# Patient Record
Sex: FEMALE | Race: BLACK OR AFRICAN AMERICAN | NOT HISPANIC OR LATINO | Employment: UNEMPLOYED | URBAN - NONMETROPOLITAN AREA
[De-identification: names, ages, dates, MRNs, and addresses within clinical notes are randomized per-mention and may not be internally consistent; named-entity substitution may affect disease eponyms.]

---

## 2017-09-27 ENCOUNTER — APPOINTMENT (OUTPATIENT)
Dept: LAB | Facility: HOSPITAL | Age: 27
End: 2017-09-27

## 2017-09-27 ENCOUNTER — TRANSCRIBE ORDERS (OUTPATIENT)
Dept: ADMINISTRATIVE | Facility: HOSPITAL | Age: 27
End: 2017-09-27

## 2017-09-27 DIAGNOSIS — Z79.899 POLYPHARMACY: Primary | ICD-10-CM

## 2017-09-27 LAB
AMPHET+METHAMPHET UR QL: NEGATIVE
BARBITURATES UR QL SCN: NEGATIVE
BENZODIAZ UR QL SCN: NEGATIVE
CANNABINOIDS SERPL QL: NEGATIVE
COCAINE UR QL: NEGATIVE
METHADONE UR QL SCN: NEGATIVE
OPIATES UR QL: NEGATIVE
PCP UR QL SCN: NEGATIVE

## 2017-09-27 PROCEDURE — 80307 DRUG TEST PRSMV CHEM ANLYZR: CPT | Performed by: PSYCHIATRY & NEUROLOGY

## 2017-12-27 ENCOUNTER — HOSPITAL ENCOUNTER (INPATIENT)
Age: 27
LOS: 5 days | Discharge: HOME | DRG: 885 | End: 2018-01-02
Attending: EMERGENCY MEDICINE | Admitting: PSYCHIATRY & NEUROLOGY
Payer: COMMERCIAL

## 2017-12-27 DIAGNOSIS — F20.9 SCHIZOPHRENIA, UNSPECIFIED TYPE (HCC): Primary | ICD-10-CM

## 2017-12-27 PROCEDURE — 99285 EMERGENCY DEPT VISIT HI MDM: CPT

## 2017-12-27 RX ORDER — ARIPIPRAZOLE 20 MG/1
20 TABLET ORAL DAILY
Status: ON HOLD | COMMUNITY
End: 2018-01-02 | Stop reason: HOSPADM

## 2017-12-27 RX ORDER — DIVALPROEX SODIUM 250 MG/1
250 TABLET, DELAYED RELEASE ORAL 3 TIMES DAILY
Status: ON HOLD | COMMUNITY
End: 2018-01-02 | Stop reason: HOSPADM

## 2017-12-27 ASSESSMENT — ENCOUNTER SYMPTOMS
SHORTNESS OF BREATH: 0
RHINORRHEA: 0
COUGH: 0
CONSTIPATION: 0
VOMITING: 0
NAUSEA: 0
DIARRHEA: 0
SORE THROAT: 0
TROUBLE SWALLOWING: 0
SINUS PRESSURE: 0
ABDOMINAL PAIN: 0

## 2017-12-28 PROBLEM — F29 PSYCHOSIS (HCC): Status: ACTIVE | Noted: 2017-12-28

## 2017-12-28 LAB
ALBUMIN SERPL-MCNC: 4.4 G/DL (ref 3.5–5.2)
ALP BLD-CCNC: 93 U/L (ref 35–104)
ALT SERPL-CCNC: 22 U/L (ref 5–33)
AMPHETAMINE SCREEN, URINE: NEGATIVE
ANION GAP SERPL CALCULATED.3IONS-SCNC: 14 MMOL/L (ref 7–19)
AST SERPL-CCNC: 19 U/L (ref 5–32)
BARBITURATE SCREEN URINE: NEGATIVE
BASOPHILS ABSOLUTE: 0 K/UL (ref 0–0.2)
BASOPHILS RELATIVE PERCENT: 0.3 % (ref 0–1)
BENZODIAZEPINE SCREEN, URINE: NEGATIVE
BILIRUB SERPL-MCNC: 0.4 MG/DL (ref 0.2–1.2)
BUN BLDV-MCNC: 9 MG/DL (ref 6–20)
CALCIUM SERPL-MCNC: 9.3 MG/DL (ref 8.6–10)
CANNABINOID SCREEN URINE: NEGATIVE
CHLORIDE BLD-SCNC: 100 MMOL/L (ref 98–111)
CO2: 23 MMOL/L (ref 22–29)
COCAINE METABOLITE SCREEN URINE: NEGATIVE
CREAT SERPL-MCNC: 0.8 MG/DL (ref 0.5–0.9)
EOSINOPHILS ABSOLUTE: 0.1 K/UL (ref 0–0.6)
EOSINOPHILS RELATIVE PERCENT: 0.7 % (ref 0–5)
ETHANOL: <10 MG/DL (ref 0–0.08)
GFR NON-AFRICAN AMERICAN: >60
GLUCOSE BLD-MCNC: 105 MG/DL (ref 74–109)
HCG(URINE) PREGNANCY TEST: NEGATIVE
HCT VFR BLD CALC: 39.4 % (ref 37–47)
HEMOGLOBIN: 14.4 G/DL (ref 12–16)
LYMPHOCYTES ABSOLUTE: 2.7 K/UL (ref 1.1–4.5)
LYMPHOCYTES RELATIVE PERCENT: 36 % (ref 20–40)
Lab: NORMAL
MCH RBC QN AUTO: 33.2 PG (ref 27–31)
MCHC RBC AUTO-ENTMCNC: 36.5 G/DL (ref 33–37)
MCV RBC AUTO: 90.8 FL (ref 81–99)
MONOCYTES ABSOLUTE: 0.9 K/UL (ref 0–0.9)
MONOCYTES RELATIVE PERCENT: 11.3 % (ref 0–10)
NEUTROPHILS ABSOLUTE: 3.9 K/UL (ref 1.5–7.5)
NEUTROPHILS RELATIVE PERCENT: 51.6 % (ref 50–65)
OPIATE SCREEN URINE: NEGATIVE
PDW BLD-RTO: 12.7 % (ref 11.5–14.5)
PLATELET # BLD: 282 K/UL (ref 130–400)
PMV BLD AUTO: 10.7 FL (ref 9.4–12.3)
POTASSIUM SERPL-SCNC: 4 MMOL/L (ref 3.5–5)
RBC # BLD: 4.34 M/UL (ref 4.2–5.4)
SODIUM BLD-SCNC: 137 MMOL/L (ref 136–145)
TOTAL PROTEIN: 7.8 G/DL (ref 6.6–8.7)
TSH SERPL DL<=0.05 MIU/L-ACNC: 1.36 UIU/ML (ref 0.27–4.2)
VALPROIC ACID LEVEL: 61 UG/ML (ref 50–100)
VITAMIN B-12: 689 PG/ML (ref 211–946)
VITAMIN D 25-HYDROXY: 27.3 NG/ML
WBC # BLD: 7.5 K/UL (ref 4.8–10.8)

## 2017-12-28 PROCEDURE — 90791 PSYCH DIAGNOSTIC EVALUATION: CPT | Performed by: PSYCHIATRY & NEUROLOGY

## 2017-12-28 PROCEDURE — 80053 COMPREHEN METABOLIC PANEL: CPT

## 2017-12-28 PROCEDURE — 80164 ASSAY DIPROPYLACETIC ACD TOT: CPT

## 2017-12-28 PROCEDURE — 6370000000 HC RX 637 (ALT 250 FOR IP): Performed by: PSYCHIATRY & NEUROLOGY

## 2017-12-28 PROCEDURE — 82607 VITAMIN B-12: CPT

## 2017-12-28 PROCEDURE — 1240000000 HC EMOTIONAL WELLNESS R&B

## 2017-12-28 PROCEDURE — 81025 URINE PREGNANCY TEST: CPT

## 2017-12-28 PROCEDURE — 82306 VITAMIN D 25 HYDROXY: CPT

## 2017-12-28 PROCEDURE — 99284 EMERGENCY DEPT VISIT MOD MDM: CPT | Performed by: EMERGENCY MEDICINE

## 2017-12-28 PROCEDURE — 36415 COLL VENOUS BLD VENIPUNCTURE: CPT

## 2017-12-28 PROCEDURE — 84443 ASSAY THYROID STIM HORMONE: CPT

## 2017-12-28 PROCEDURE — 80307 DRUG TEST PRSMV CHEM ANLYZR: CPT

## 2017-12-28 PROCEDURE — G0480 DRUG TEST DEF 1-7 CLASSES: HCPCS

## 2017-12-28 PROCEDURE — 85025 COMPLETE CBC W/AUTO DIFF WBC: CPT

## 2017-12-28 RX ORDER — DIVALPROEX SODIUM 250 MG/1
250 TABLET, DELAYED RELEASE ORAL 3 TIMES DAILY
Status: DISCONTINUED | OUTPATIENT
Start: 2017-12-28 | End: 2018-01-02 | Stop reason: HOSPADM

## 2017-12-28 RX ORDER — ARIPIPRAZOLE 10 MG/1
10 TABLET ORAL DAILY
Status: DISCONTINUED | OUTPATIENT
Start: 2017-12-28 | End: 2018-01-02 | Stop reason: HOSPADM

## 2017-12-28 RX ADMIN — DIVALPROEX SODIUM 250 MG: 250 TABLET, DELAYED RELEASE ORAL at 21:30

## 2017-12-28 RX ADMIN — ARIPIPRAZOLE 10 MG: 10 TABLET ORAL at 13:45

## 2017-12-28 RX ADMIN — DIVALPROEX SODIUM 250 MG: 250 TABLET, DELAYED RELEASE ORAL at 18:02

## 2017-12-28 ASSESSMENT — SLEEP AND FATIGUE QUESTIONNAIRES
DIFFICULTY ARISING: YES
RESTFUL SLEEP: YES
DIFFICULTY FALLING ASLEEP: NO
AVERAGE NUMBER OF SLEEP HOURS: 12
DIFFICULTY STAYING ASLEEP: NO
DO YOU USE A SLEEP AID: NO
DO YOU HAVE DIFFICULTY SLEEPING: YES

## 2017-12-28 ASSESSMENT — LIFESTYLE VARIABLES: HISTORY_ALCOHOL_USE: NO

## 2017-12-28 NOTE — PLAN OF CARE
Problem: Altered Mood, Psychotic Behavior  Goal: LTG-Able to verbalize decrease in frequency and intensity of hallucinations  Outcome: Ongoing    Goal: LTG-Able to verbalize reality based thinking  Outcome: Ongoing    Goal: LTG-Absence of self-harm  Outcome: Ongoing    Goal: LTG-Appropriate social interaction  Outcome: Ongoing    Goal: STG-Medication therapy compliance  Outcome: Ongoing

## 2017-12-28 NOTE — PROGRESS NOTES
Chinedu I completed psychosocial and CSSR-S on this date. Pt long and short term goals discussed. Pt voiced understanding. Treatment sheet signed.     In the last 6 months has the pt been danger to self: Yes  In the last 6 months has the pt been danger to others: No      Provided pt with [employer] handout entitled \"Quitting Smoking,\" reviewed handout with pt addressing dangers of smoking, developing coping skills, and providing basic information about quitting. Patient declined practical counseling of tobacco practical counseling during the hospital stay.      Electronically signed by Noe Myers on 12/28/2017 at 1:51 PM

## 2017-12-28 NOTE — PROGRESS NOTES
Admission Note      Reason for admission/Target Symptom: increasing depression and SI  Diagnoses:  UDS: Negative- Benzo- Opiate- Amphetamines- THC- Cocaine- Barbs  BAL: Negative     SW met with treatment team to discuss patient treatment and follow up care plans. Pt was admitted to [facility]. Treatment team has  identified patients discharge needs as medication management and therapy with [address] [place] [address]. Pt validated need for appointments. Pt was also provided a handout of contact information for drug and alcohol treatment centers and other community support service such as [facility] .

## 2017-12-28 NOTE — PROGRESS NOTES
Group Therapy Note    Start Time: 0900   End Time:  0930  Number of Participants: 15    Type of Group: Community Meeting       Patient's Goal:  Pt didn't list any goals      Notes:      Participation Level:  Active Listener       Participation Quality: Appropriate      Thought Process/Content: Logical      Affective Functioning: Congruent      Mood: calm      Level of consciousness:  Alert      Modes of Intervention: Support      Discipline Responsible: Behavioral Health Tech II      Signature:  Supriya Wray

## 2017-12-28 NOTE — PLAN OF CARE
Problem: Altered Mood, Depressive Behavior  Goal: STG-Knowledge of positive coping patterns  Outcome: Ongoing  Date: 12/28/2017  Start Time: 1100  End Time:  1659  Number of Participants: 13     Type of Group: Psychoeducation     Wellness Binder Information  Module Name:  Staying Well   Session Number:  1     Patient's Goal:  Daily maintenance and coping skills      Notes:  Pt participated in group discussion on relieving anxiety     Status After Intervention:  Improved     Participation Level: Active Listener and Interactive     Participation Quality: Appropriate, Attentive and Sharing        Speech:  normal        Thought Process/Content: Logical        Affective Functioning: Congruent        Mood: congruent         Level of consciousness:  Alert, Oriented x4 and Attentive        Response to Learning: Able to verbalize current knowledge/experience, Able to verbalize/acknowledge new learning and Able to retain information        Endings: None Reported     Modes of Intervention: Education, Support and Socialization        Discipline Responsible: Psychoeducational Specialist        Signature:   Pillo Rodriguez

## 2017-12-28 NOTE — PLAN OF CARE
Problem: Altered Mood, Depressive Behavior  Goal: LTG-Able to verbalize acceptance of life and situations over which he or she has no control  Outcome: Ongoing  Group Therapy Note     Date: 12/28/2017  Start Time:1430  End Time:  1515  Number of Participants: 13     Type of Group: Cognitive Skills     Wellness Binder Information  Module Name:  Spiritual Wellness   Session Number:  1     Patient's Goal:  Hope      Notes:  Pt discussed the 10 Principles of Healing and ways to foster hope      Status After Intervention:  Improved     Participation Level: Active Listener and Interactive     Participation Quality: Appropriate, Attentive and Sharing        Speech:  normal        Thought Process/Content: Logical        Affective Functioning: Congruent        Mood: congruent         Level of consciousness:  Alert, Oriented x4 and Attentive        Response to Learning: Able to verbalize current knowledge/experience, Able to verbalize/acknowledge new learning and Able to retain information        Endings: None Reported     Modes of Intervention: Education, Support and Socialization        Discipline Responsible: Psychoeducational Specialist        Signature:   Pillo Rodriguez

## 2017-12-29 PROCEDURE — 99231 SBSQ HOSP IP/OBS SF/LOW 25: CPT | Performed by: PSYCHIATRY & NEUROLOGY

## 2017-12-29 PROCEDURE — 6370000000 HC RX 637 (ALT 250 FOR IP): Performed by: PSYCHIATRY & NEUROLOGY

## 2017-12-29 PROCEDURE — 1240000000 HC EMOTIONAL WELLNESS R&B

## 2017-12-29 RX ORDER — ACETAMINOPHEN 325 MG/1
650 TABLET ORAL EVERY 4 HOURS PRN
Status: DISCONTINUED | OUTPATIENT
Start: 2017-12-29 | End: 2018-01-02 | Stop reason: HOSPADM

## 2017-12-29 RX ADMIN — DIVALPROEX SODIUM 250 MG: 250 TABLET, DELAYED RELEASE ORAL at 15:38

## 2017-12-29 RX ADMIN — ARIPIPRAZOLE 10 MG: 10 TABLET ORAL at 08:42

## 2017-12-29 RX ADMIN — DIVALPROEX SODIUM 250 MG: 250 TABLET, DELAYED RELEASE ORAL at 08:42

## 2017-12-29 RX ADMIN — DIVALPROEX SODIUM 250 MG: 250 TABLET, DELAYED RELEASE ORAL at 21:21

## 2017-12-29 RX ADMIN — ACETAMINOPHEN 650 MG: 325 TABLET ORAL at 01:35

## 2017-12-29 RX ADMIN — MAGNESIUM HYDROXIDE 30 ML: 400 SUSPENSION ORAL at 08:43

## 2017-12-29 ASSESSMENT — PAIN SCALES - GENERAL: PAINLEVEL_OUTOF10: 5

## 2017-12-29 NOTE — PROGRESS NOTES
Group Therapy Note    Start Time: 900  End Time:  997  Number of Participants: 15    Type of Group: Community Meeting       Patient's Goal:  \"don't know\"      Notes:      Participation Level:  Active Listener       Participation Quality: Appropriate      Thought Process/Content: Logical      Affective Functioning: Congruent      Mood: calm      Level of consciousness:  Alert      Modes of Intervention: Support      Discipline Responsible: Behavioral Health Tech II      Signature:  Guerda Parikh

## 2017-12-29 NOTE — PROGRESS NOTES
12/29/2017 11:02 AM   Progress Note        Curtis Brooks 1990  Psychotherapy Time Spent: 15 min      Psychotherapy Topics: health    Chief Complaint   Patient presents with    Psychiatric Evaluation       Subjective:  Patient seen today. Says she feels \"better\". Has not had any visual hallucinations since being admitted here. Nevertheless, she was hearing voices yesterday. She cannot tell me what they were saying, other than to say that they were \"weird\" and \"stupid\". No matter how much I tried she could not tell me anything about the nature of the content of the voices (male, female, single, multiple, loud or soft, and so on), which makes me wonder about the true validity of the experience. .. Rates depression 0 and anxiety 0. I'm not sure she is real familiar with this way of measuring symptoms. ... Patient reports side effects as follows: none. Reports no suicidal ideation. Reports compliance with medications as good . Review of Systems - Negative except see above    Current Meds:    Prior to Admission medications    Medication Sig Start Date End Date Taking? Authorizing Provider   divalproex (DEPAKOTE) 250 MG DR tablet Take 250 mg by mouth 3 times daily   Yes Historical Provider, MD   ARIPiprazole (ABILIFY) 20 MG tablet Take 20 mg by mouth daily   Yes Historical Provider, MD   haloperidol decanoate (HALDOL DECANOATE) 50 MG/ML injection Inject 50 mg into the muscle once   Yes Historical Provider, MD       @    MSE:  Patient is  A & O x3. Appearance:  well-appearing  Cognition:  Recent memory intact , remote memory intact , fair fund of knowledge, average (vs ? Below average) intelligence level. Speech:  normal  Language: Naming: intact;  Word Finding: intact  Conversation no evidence of delusions  Behavior:  Cooperative and Tense  Mood: depressed and anxious  Affect: congruent with mood and restricted  Thought Content: negative delusions, obsessions, homicidal and suicidal  Thought Process:

## 2017-12-29 NOTE — PROGRESS NOTES
Pt reports not sleeping well last night, she states \"I was creeped out, I kept seeing something like it was seeing something in my eyeball but I still felt happy. \"   She denies any anxiety or depression this morning. She denies SI and HI. She reports having visual hallucinations but no auditory this morning. She did shower this morning and dress for breakfast.  She is social and is med compliant.

## 2017-12-29 NOTE — PROGRESS NOTES
Group Therapy Note    Date: 12/28  Time: 2100    Patient attended and participated in [id number] [facility]. Filled out Wrap Up Group Documentation.     THELMA Escobar

## 2017-12-29 NOTE — PLAN OF CARE
Problem: Altered Mood, Depressive Behavior  Goal: STG-Knowledge of positive coping patterns  Outcome: Ongoing                                                                      Group Therapy Note    Date: 12/29/2017  Start Time: 1430  End Time:  9008  Number of Participants: 12    Type of Group: Cognitive Skills    Wellness Binder Information  Module Name:  Emotional wellness  Session Number:  4    Patient's Goal:  Self-esteem    Notes:  Pt was verbally prompted to attend group. Pt refused. Information about self-esteem was provided. Status After Intervention:      Participation Level:     Participation Quality:       Speech:         Thought Process/Content:       Affective Functioning:       Mood:       Level of consciousness:        Response to Learning:       Endings:     Modes of Intervention:       Discipline Responsible: psychoeducational specialist      Signature:  Zainab Vergara

## 2017-12-30 PROCEDURE — 3E0234Z INTRODUCTION OF SERUM, TOXOID AND VACCINE INTO MUSCLE, PERCUTANEOUS APPROACH: ICD-10-PCS | Performed by: PSYCHIATRY & NEUROLOGY

## 2017-12-30 PROCEDURE — 6370000000 HC RX 637 (ALT 250 FOR IP): Performed by: PSYCHIATRY & NEUROLOGY

## 2017-12-30 PROCEDURE — 90674 CCIIV4 VAC NO PRSV 0.5 ML IM: CPT | Performed by: PSYCHIATRY & NEUROLOGY

## 2017-12-30 PROCEDURE — 6360000002 HC RX W HCPCS: Performed by: PSYCHIATRY & NEUROLOGY

## 2017-12-30 PROCEDURE — G0008 ADMIN INFLUENZA VIRUS VAC: HCPCS | Performed by: PSYCHIATRY & NEUROLOGY

## 2017-12-30 PROCEDURE — 1240000000 HC EMOTIONAL WELLNESS R&B

## 2017-12-30 PROCEDURE — 99231 SBSQ HOSP IP/OBS SF/LOW 25: CPT | Performed by: PSYCHIATRY & NEUROLOGY

## 2017-12-30 RX ORDER — HALOPERIDOL 2 MG/1
2 TABLET ORAL 2 TIMES DAILY
Status: DISCONTINUED | OUTPATIENT
Start: 2017-12-30 | End: 2018-01-02 | Stop reason: HOSPADM

## 2017-12-30 RX ADMIN — DIVALPROEX SODIUM 250 MG: 250 TABLET, DELAYED RELEASE ORAL at 08:19

## 2017-12-30 RX ADMIN — HALOPERIDOL 2 MG: 2 TABLET ORAL at 21:00

## 2017-12-30 RX ADMIN — A/SINGAPORE/GP1908/2015 IVR-180 (H1N1) (AN A/MICHIGAN/45/2015-LIKE VIRUS), A/SINGAPORE/GP2050/2015 (H3N2) (AN A/HONG KONG/4801/2014 - LIKE VIRUS), B/UTAH/9/2014 (A B/PHUKET/3073/2013-LIKE VIRUS), B/HONG KONG/259/2010 (A B/BRISBANE/60/08-LIKE VIRUS) 0.5 ML: 15; 15; 15; 15 INJECTION, SUSPENSION INTRAMUSCULAR at 06:20

## 2017-12-30 RX ADMIN — ACETAMINOPHEN 650 MG: 325 TABLET ORAL at 10:56

## 2017-12-30 RX ADMIN — MAGNESIUM HYDROXIDE 30 ML: 400 SUSPENSION ORAL at 21:00

## 2017-12-30 RX ADMIN — DIVALPROEX SODIUM 250 MG: 250 TABLET, DELAYED RELEASE ORAL at 21:00

## 2017-12-30 RX ADMIN — ARIPIPRAZOLE 10 MG: 10 TABLET ORAL at 08:19

## 2017-12-30 RX ADMIN — DIVALPROEX SODIUM 250 MG: 250 TABLET, DELAYED RELEASE ORAL at 15:58

## 2017-12-30 RX ADMIN — HALOPERIDOL 2 MG: 2 TABLET ORAL at 10:33

## 2017-12-30 ASSESSMENT — PAIN SCALES - GENERAL: PAINLEVEL_OUTOF10: 5

## 2017-12-31 PROCEDURE — 6370000000 HC RX 637 (ALT 250 FOR IP): Performed by: PSYCHIATRY & NEUROLOGY

## 2017-12-31 PROCEDURE — 1240000000 HC EMOTIONAL WELLNESS R&B

## 2017-12-31 RX ORDER — DOCUSATE SODIUM 100 MG/1
100 CAPSULE, LIQUID FILLED ORAL 2 TIMES DAILY
Status: DISCONTINUED | OUTPATIENT
Start: 2018-01-01 | End: 2018-01-02 | Stop reason: HOSPADM

## 2017-12-31 RX ORDER — ERGOCALCIFEROL (VITAMIN D2) 1250 MCG
50000 CAPSULE ORAL WEEKLY
Qty: 11 CAPSULE | Refills: 0 | Status: SHIPPED | OUTPATIENT
Start: 2018-01-01 | End: 2018-03-13

## 2017-12-31 RX ORDER — ERGOCALCIFEROL 1.25 MG/1
50000 CAPSULE ORAL WEEKLY
Status: DISCONTINUED | OUTPATIENT
Start: 2018-01-01 | End: 2018-01-02 | Stop reason: HOSPADM

## 2017-12-31 RX ORDER — POLYETHYLENE GLYCOL 3350 17 G/17G
17 POWDER, FOR SOLUTION ORAL DAILY
Status: DISCONTINUED | OUTPATIENT
Start: 2018-01-01 | End: 2018-01-02 | Stop reason: HOSPADM

## 2017-12-31 RX ADMIN — DIVALPROEX SODIUM 250 MG: 250 TABLET, DELAYED RELEASE ORAL at 10:11

## 2017-12-31 RX ADMIN — HALOPERIDOL 2 MG: 2 TABLET ORAL at 10:10

## 2017-12-31 RX ADMIN — ARIPIPRAZOLE 10 MG: 10 TABLET ORAL at 10:10

## 2017-12-31 RX ADMIN — DIVALPROEX SODIUM 250 MG: 250 TABLET, DELAYED RELEASE ORAL at 21:16

## 2017-12-31 RX ADMIN — DIVALPROEX SODIUM 250 MG: 250 TABLET, DELAYED RELEASE ORAL at 14:41

## 2017-12-31 RX ADMIN — HALOPERIDOL 2 MG: 2 TABLET ORAL at 21:16

## 2017-12-31 RX ADMIN — MAGNESIUM HYDROXIDE 30 ML: 400 SUSPENSION ORAL at 10:24

## 2017-12-31 NOTE — PLAN OF CARE
Problem: Altered Mood, Depressive Behavior  Goal: STG-Able to verbalize support system  Outcome: Ongoing                                                                      Group Therapy Note    Date: 12/31/2017  Start Time: 0631  End Time:  4273  Number of Participants: 14    Type of Group: Cognitive Skills    Wellness Binder Information  Module Name:  Social Support  Session Number:  1. Patient's Goal:  Why is Social Support so Important? SW used verbal prompts to encourage pt to attend group but pt refused.       Discipline Responsible: Psychoeducational Specialist      Signature:  Sen Bliss

## 2017-12-31 NOTE — PROGRESS NOTES
Pt provided a pair of 3.25, brunette reading glasses for her to wear on unit and take home with her upon discharge.

## 2017-12-31 NOTE — PROGRESS NOTES
Pt ALOx4, pleasant and redirectable. Pt is cooperative, eats well, is not social but med compliant. Pt is flat and lacks expression when communicating. Pt has delayed response to questions but thus far has not had any issues. Pt has requested glasses to help her see for the last few days. Attempted [facility] to see if there was an extra pair. The glasses were not strong enough, so I returned them to Tommy Harry RN, on [facility]. Pt rates depression 0, anxiety 0, and hears voices that sounds like monsters and almost saw something this morning, but didn't know what it was. Pt denies SI and HI.

## 2018-01-01 PROCEDURE — 1240000000 HC EMOTIONAL WELLNESS R&B

## 2018-01-01 PROCEDURE — 6370000000 HC RX 637 (ALT 250 FOR IP): Performed by: PSYCHIATRY & NEUROLOGY

## 2018-01-01 PROCEDURE — 6370000000 HC RX 637 (ALT 250 FOR IP): Performed by: FAMILY MEDICINE

## 2018-01-01 PROCEDURE — 99231 SBSQ HOSP IP/OBS SF/LOW 25: CPT | Performed by: PSYCHIATRY & NEUROLOGY

## 2018-01-01 RX ORDER — MAGNESIUM HYDROXIDE/ALUMINUM HYDROXICE/SIMETHICONE 120; 1200; 1200 MG/30ML; MG/30ML; MG/30ML
30 SUSPENSION ORAL EVERY 6 HOURS PRN
Status: DISCONTINUED | OUTPATIENT
Start: 2018-01-01 | End: 2018-01-02 | Stop reason: HOSPADM

## 2018-01-01 RX ADMIN — HALOPERIDOL 2 MG: 2 TABLET ORAL at 20:47

## 2018-01-01 RX ADMIN — DIVALPROEX SODIUM 250 MG: 250 TABLET, DELAYED RELEASE ORAL at 13:16

## 2018-01-01 RX ADMIN — MAGNESIUM HYDROXIDE 30 ML: 400 SUSPENSION ORAL at 11:19

## 2018-01-01 RX ADMIN — DIVALPROEX SODIUM 250 MG: 250 TABLET, DELAYED RELEASE ORAL at 20:47

## 2018-01-01 RX ADMIN — ARIPIPRAZOLE 10 MG: 10 TABLET ORAL at 10:17

## 2018-01-01 RX ADMIN — DOCUSATE SODIUM 100 MG: 100 CAPSULE, LIQUID FILLED ORAL at 11:22

## 2018-01-01 RX ADMIN — POLYETHYLENE GLYCOL 3350 17 G: 17 POWDER, FOR SOLUTION ORAL at 11:19

## 2018-01-01 RX ADMIN — HALOPERIDOL 2 MG: 2 TABLET ORAL at 11:23

## 2018-01-01 RX ADMIN — ACETAMINOPHEN 650 MG: 325 TABLET ORAL at 13:16

## 2018-01-01 RX ADMIN — ERGOCALCIFEROL 50000 UNITS: 1.25 CAPSULE ORAL at 11:22

## 2018-01-01 RX ADMIN — DIVALPROEX SODIUM 250 MG: 250 TABLET, DELAYED RELEASE ORAL at 11:22

## 2018-01-01 RX ADMIN — ACETAMINOPHEN 650 MG: 325 TABLET ORAL at 22:15

## 2018-01-01 RX ADMIN — DOCUSATE SODIUM 100 MG: 100 CAPSULE, LIQUID FILLED ORAL at 20:47

## 2018-01-01 ASSESSMENT — PAIN SCALES - GENERAL
PAINLEVEL_OUTOF10: 5
PAINLEVEL_OUTOF10: 3

## 2018-01-01 NOTE — PLAN OF CARE
Problem: Altered Mood, Depressive Behavior  Goal: STG-Knowledge of positive coping patterns  Outcome: Ongoing                                                                      Group Therapy Note    Date: 1/1/2018  Start Time: 5372  End Time:  1600  Number of Participants: 13    Type of Group: Recovery    Wellness Binder Information  Module Name:  Staying well  Session Number:  1    Patient's Goal:  Daily maintenance and coping skills    Notes:  Pt acknowledged use of positive coping skills daily to help stay well.     Status After Intervention:  Improved    Participation Level: Interactive    Participation Quality: Appropriate, Attentive and Sharing      Speech:  normal      Thought Process/Content: Logical      Affective Functioning: Congruent      Mood: congruent      Level of consciousness:  Alert, Oriented x4 and Attentive      Response to Learning: Able to verbalize current knowledge/experience      Endings: None Reported    Modes of Intervention: Education      Discipline Responsible: Psychoeducational Specialist      Signature:  Wilfred Mcneil
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
## 2018-01-01 NOTE — PLAN OF CARE
Problem: Altered Mood, Depressive Behavior  Goal: STG-Knowledge of positive coping patterns  Outcome: Ongoing                                                                      Group Therapy Note    Date: 1/1/2018  Start Time: 1430  End Time:  9480  Number of Participants: 13    Type of Group: Cognitive Skills    Wellness Binder Information  Module Name:  Social wellness  Session Number:  1    Patient's Goal:  Your support network    Notes:  Pt acknowledged , Doctor, and Family as examples of a support network.     Status After Intervention:  Improved    Participation Level: Interactive    Participation Quality: Appropriate, Attentive and Sharing      Speech:  normal      Thought Process/Content: Logical      Affective Functioning: Congruent      Mood: congruent      Level of consciousness:  Alert, Oriented x4 and Attentive      Response to Learning: Able to verbalize current knowledge/experience      Endings: None Reported    Modes of Intervention: Education      Discipline Responsible: Psychoeducational Specialist      Signature:  Gatito Fonseca

## 2018-01-02 VITALS
HEIGHT: 61 IN | OXYGEN SATURATION: 100 % | WEIGHT: 241.8 LBS | SYSTOLIC BLOOD PRESSURE: 143 MMHG | DIASTOLIC BLOOD PRESSURE: 88 MMHG | BODY MASS INDEX: 45.65 KG/M2 | HEART RATE: 87 BPM | RESPIRATION RATE: 16 BRPM | TEMPERATURE: 97.9 F

## 2018-01-02 PROBLEM — F32.A DEPRESSION: Status: ACTIVE | Noted: 2018-01-02

## 2018-01-02 PROCEDURE — 99238 HOSP IP/OBS DSCHRG MGMT 30/<: CPT | Performed by: PSYCHIATRY & NEUROLOGY

## 2018-01-02 PROCEDURE — 6370000000 HC RX 637 (ALT 250 FOR IP): Performed by: PSYCHIATRY & NEUROLOGY

## 2018-01-02 PROCEDURE — 5130000000 HC BRIDGE APPOINTMENT

## 2018-01-02 PROCEDURE — 6370000000 HC RX 637 (ALT 250 FOR IP): Performed by: FAMILY MEDICINE

## 2018-01-02 RX ORDER — HALOPERIDOL 2 MG/1
2 TABLET ORAL 2 TIMES DAILY
Qty: 60 TABLET | Refills: 0 | Status: SHIPPED | OUTPATIENT
Start: 2018-01-02

## 2018-01-02 RX ORDER — ARIPIPRAZOLE 10 MG/1
10 TABLET ORAL DAILY
Qty: 30 TABLET | Refills: 0 | Status: SHIPPED | OUTPATIENT
Start: 2018-01-03

## 2018-01-02 RX ORDER — DIVALPROEX SODIUM 250 MG/1
250 TABLET, DELAYED RELEASE ORAL 3 TIMES DAILY
Qty: 90 TABLET | Refills: 0 | Status: SHIPPED | OUTPATIENT
Start: 2018-01-02

## 2018-01-02 RX ADMIN — ARIPIPRAZOLE 10 MG: 10 TABLET ORAL at 08:56

## 2018-01-02 RX ADMIN — DOCUSATE SODIUM 100 MG: 100 CAPSULE, LIQUID FILLED ORAL at 08:56

## 2018-01-02 RX ADMIN — POLYETHYLENE GLYCOL 3350 17 G: 17 POWDER, FOR SOLUTION ORAL at 08:56

## 2018-01-02 RX ADMIN — DIVALPROEX SODIUM 250 MG: 250 TABLET, DELAYED RELEASE ORAL at 08:56

## 2018-01-02 RX ADMIN — HALOPERIDOL 2 MG: 2 TABLET ORAL at 08:56

## 2018-01-02 NOTE — PROGRESS NOTES
Group Therapy Note    Start Time: 0900  End Time:  0930  Number of Participants: 18    Type of Group: Community Meeting       Patient's Goal:  \"going home on discharge\"      Notes:      Participation Level:  Active Listener       Participation Quality: Appropriate      Thought Process/Content: Logical      Affective Functioning: Congruent      Mood: calm      Level of consciousness:  Alert      Modes of Intervention: Support      Discipline Responsible: Behavioral Health Tech II      Signature:  Amira Ventura

## 2018-01-02 NOTE — PROGRESS NOTES
BHI Daily Shift Assessment  Nursing Progress Note    Room: [facility]/605-02 Name: Tamiko Su Age: 33 y.o. Ethnicity: -American Gender: female   Dx: <principal problem not specified>  Precautions: suicide risk  CPAP: No Accu-Chek: No  MSE:  Status and Exam  Normal: No  Facial Expression: Flat  Affect: Appropriate  Level of Consciousness: Alert  Mood:Normal: Yes  Mood: Anxious, Suspicious  Motor Activity:Normal: Yes  Motor Activity: Decreased  Interview Behavior: Cooperative  Preception: [place] to Person, [place] to Time, [place] to Place, [place] to Situation  Attention:Normal: Yes  Attention: Unable to Concentrate  Thought Processes: Blocking  Thought Content:Normal: Yes  Thought Content: Paranoia, Poverty of Content, Preoccupations  Hallucinations: None  Delusions: No  Delusions: Persecution  Memory:Normal: Yes  Memory: Poor Recent, Poor Remote  Insight and Judgment: No  Insight and Judgment: Poor Judgment, Poor Insight  Present Suicidal Ideation: No  Present Homicidal Ideation: No  Sleep: Yes, Fair, no sleep issues Hours Slept:  Sched Sleep Meds: Yes PRN Sleep Meds: No Other PRN Meds: Yes Med Compliant: Yes Appetite: good Percent Meals: 75% Social: slightly ADLs: Yes Speech: normal Depression: 0 Anxiety: 0    Pt asked for tylenol for a headache tonight, also wanted lotion for her scalp. No obvious s/s of distress voiced or noted. Will continue to monitor.     Isiah Jama RN

## 2018-01-02 NOTE — DISCHARGE SUMMARY
Discharge Summary     Patient ID:  Melanie Montero  [id number]  57 y.o.  1990    Admit date: 12/27/2017  Discharge date: 1/2/2018    Admitting Physician: Gatito Seymour MD   Attending Physician: Gatito Seymour MD  Discharge Physician: Melvina Menon     Admission Diagnoses: Hallucinations [R44.3]  Psychosis, unspecified psychosis type [F29]  Depression, unspecified depression type [F32.9]    Discharge Diagnoses: Depression    Admission Condition: poor    Discharged Condition: improved    Indication for Admission: The patient is a 55-year-old single [facility] female, who is disabled. She has no children. She does not work. Her chief complaint was \"I saw an evil spirit in the TV, I did not feel  safe\", so she decided to come here. Hospital Course: Patient was admitted to the unit and oriented to it. She related well to peers and staff. She attended groups. She was able to consistently deny suicidal ideas virtually throughout this admission. She requested to be put back on Haldol, which I felt to be a reasonable request, especially given verified that apparently she responded well to it in the past. This time again she responded well when she was able to consistently deny hallucinations throughout the last few days of this admission. She was discharged uneventfully.     Consults: Dr. Yuniel Ace for medical    Significant Diagnostic Studies: Routine labs    Treatments: Medications, groups and individual counseling    Discharge Exam:  Within normal limits, with no suicidal ideas or hallucinations    Disposition: home    Patient Instructions:   Current Discharge Medication List      START taking these medications    Details   haloperidol (HALDOL) 2 MG tablet Take 1 tablet by mouth 2 times daily  Qty: 60 tablet, Refills: 0      vitamin D (ERGOCALCIFEROL) 73972 units capsule Take 1 capsule by mouth once a week for 11 doses  Qty: 11 capsule, Refills: 0         CONTINUE these medications which have CHANGED Details   divalproex (DEPAKOTE) 250 MG DR tablet Take 1 tablet by mouth 3 times daily  Qty: 90 tablet, Refills: 0      ARIPiprazole (ABILIFY) 10 MG tablet Take 1 tablet by mouth daily  Qty: 30 tablet, Refills: 0         CONTINUE these medications which have NOT CHANGED    Details   haloperidol decanoate (HALDOL DECANOATE) 50 MG/ML injection Inject 50 mg into the muscle once         STOP taking these medications       lithium 300 MG tablet Comments:   Reason for Stopping:         carBAMazepine (TEGRETOL XR) 200 MG extended release tablet Comments:   Reason for Stopping:             Activity: activity as tolerated  Diet: regular diet  Wound Care: none needed    Follow-up with per social work.     Time worked: Less than 30 minutes    Participation:good    Electronically signed by Sudhakar Stephen MD on 1/2/2018 at 11:49 AM

## 2018-09-27 NOTE — ED NOTES
Bridget Check at [address] [facility]  12/28/17 7527
Bridget Check at pt bedside     Nikolay Hartman  12/28/17 1916
NP @ [address] [place] [address] [place], RN  12/27/17 8395
Upon entering the room pt asks me if she can go to [facility], then tells me she is tired of being scared.
Patient

## 2019-05-23 ENCOUNTER — HOSPITAL ENCOUNTER (EMERGENCY)
Age: 29
Discharge: ANOTHER HOSPITAL | End: 2019-05-23
Payer: COMMERCIAL

## 2019-05-23 VITALS
HEIGHT: 62 IN | HEART RATE: 71 BPM | SYSTOLIC BLOOD PRESSURE: 148 MMHG | DIASTOLIC BLOOD PRESSURE: 95 MMHG | WEIGHT: 240 LBS | BODY MASS INDEX: 44.16 KG/M2 | OXYGEN SATURATION: 96 % | TEMPERATURE: 98 F | RESPIRATION RATE: 17 BRPM

## 2019-05-23 DIAGNOSIS — F20.1 DISORGANIZED SCHIZOPHRENIA (HCC): Primary | ICD-10-CM

## 2019-05-23 LAB
ACETAMINOPHEN LEVEL: <15 UG/ML
ALBUMIN SERPL-MCNC: 4.3 G/DL (ref 3.5–5.2)
ALP BLD-CCNC: 84 U/L (ref 35–104)
ALT SERPL-CCNC: 20 U/L (ref 5–33)
AMPHETAMINE SCREEN, URINE: NEGATIVE
ANION GAP SERPL CALCULATED.3IONS-SCNC: 12 MMOL/L (ref 7–19)
AST SERPL-CCNC: 20 U/L (ref 5–32)
BARBITURATE SCREEN URINE: NEGATIVE
BASOPHILS ABSOLUTE: 0 K/UL (ref 0–0.2)
BASOPHILS RELATIVE PERCENT: 0.3 % (ref 0–1)
BENZODIAZEPINE SCREEN, URINE: NEGATIVE
BILIRUB SERPL-MCNC: 0.3 MG/DL (ref 0.2–1.2)
BUN BLDV-MCNC: 4 MG/DL (ref 6–20)
CALCIUM SERPL-MCNC: 9.2 MG/DL (ref 8.6–10)
CANNABINOID SCREEN URINE: NEGATIVE
CHLORIDE BLD-SCNC: 108 MMOL/L (ref 98–111)
CO2: 19 MMOL/L (ref 22–29)
COCAINE METABOLITE SCREEN URINE: NEGATIVE
CREAT SERPL-MCNC: 0.7 MG/DL (ref 0.5–0.9)
EOSINOPHILS ABSOLUTE: 0.2 K/UL (ref 0–0.6)
EOSINOPHILS RELATIVE PERCENT: 2.4 % (ref 0–5)
ETHANOL: <10 MG/DL (ref 0–0.08)
GFR NON-AFRICAN AMERICAN: >60
GLUCOSE BLD-MCNC: 95 MG/DL (ref 74–109)
HCG(URINE) PREGNANCY TEST: NEGATIVE
HCT VFR BLD CALC: 39.7 % (ref 37–47)
HEMOGLOBIN: 14.7 G/DL (ref 12–16)
LYMPHOCYTES ABSOLUTE: 3.1 K/UL (ref 1.1–4.5)
LYMPHOCYTES RELATIVE PERCENT: 49.5 % (ref 20–40)
Lab: NORMAL
MCH RBC QN AUTO: 33 PG (ref 27–31)
MCHC RBC AUTO-ENTMCNC: 37 G/DL (ref 33–37)
MCV RBC AUTO: 89.2 FL (ref 81–99)
MONOCYTES ABSOLUTE: 0.5 K/UL (ref 0–0.9)
MONOCYTES RELATIVE PERCENT: 8.6 % (ref 0–10)
NEUTROPHILS ABSOLUTE: 2.5 K/UL (ref 1.5–7.5)
NEUTROPHILS RELATIVE PERCENT: 39 % (ref 50–65)
OPIATE SCREEN URINE: NEGATIVE
PDW BLD-RTO: 13.5 % (ref 11.5–14.5)
PLATELET # BLD: 236 K/UL (ref 130–400)
PMV BLD AUTO: 11.6 FL (ref 9.4–12.3)
POTASSIUM REFLEX MAGNESIUM: 4.6 MMOL/L (ref 3.5–5)
RBC # BLD: 4.45 M/UL (ref 4.2–5.4)
SALICYLATE, SERUM: <3 MG/DL (ref 3–10)
SODIUM BLD-SCNC: 139 MMOL/L (ref 136–145)
TOTAL PROTEIN: 7.3 G/DL (ref 6.6–8.7)
VALPROIC ACID LEVEL: <2.8 UG/ML (ref 50–100)
WBC # BLD: 6.3 K/UL (ref 4.8–10.8)

## 2019-05-23 PROCEDURE — G0480 DRUG TEST DEF 1-7 CLASSES: HCPCS

## 2019-05-23 PROCEDURE — 80164 ASSAY DIPROPYLACETIC ACD TOT: CPT

## 2019-05-23 PROCEDURE — 99285 EMERGENCY DEPT VISIT HI MDM: CPT | Performed by: NURSE PRACTITIONER

## 2019-05-23 PROCEDURE — 2580000003 HC RX 258: Performed by: NURSE PRACTITIONER

## 2019-05-23 PROCEDURE — 80307 DRUG TEST PRSMV CHEM ANLYZR: CPT

## 2019-05-23 PROCEDURE — 80053 COMPREHEN METABOLIC PANEL: CPT

## 2019-05-23 PROCEDURE — 36415 COLL VENOUS BLD VENIPUNCTURE: CPT

## 2019-05-23 PROCEDURE — 84703 CHORIONIC GONADOTROPIN ASSAY: CPT

## 2019-05-23 PROCEDURE — 85025 COMPLETE CBC W/AUTO DIFF WBC: CPT

## 2019-05-23 PROCEDURE — 99284 EMERGENCY DEPT VISIT MOD MDM: CPT

## 2019-05-23 RX ORDER — ZIPRASIDONE MESYLATE 20 MG/ML
10 INJECTION, POWDER, LYOPHILIZED, FOR SOLUTION INTRAMUSCULAR ONCE
Status: DISCONTINUED | OUTPATIENT
Start: 2019-05-23 | End: 2019-05-23 | Stop reason: HOSPADM

## 2019-05-23 RX ORDER — 0.9 % SODIUM CHLORIDE 0.9 %
1000 INTRAVENOUS SOLUTION INTRAVENOUS ONCE
Status: COMPLETED | OUTPATIENT
Start: 2019-05-23 | End: 2019-05-23

## 2019-05-23 RX ORDER — ZIPRASIDONE MESYLATE 20 MG/ML
INJECTION, POWDER, LYOPHILIZED, FOR SOLUTION INTRAMUSCULAR
Status: DISCONTINUED
Start: 2019-05-23 | End: 2019-05-23 | Stop reason: HOSPADM

## 2019-05-23 RX ADMIN — SODIUM CHLORIDE 1000 ML: 9 INJECTION, SOLUTION INTRAVENOUS at 12:33

## 2019-05-23 ASSESSMENT — PAIN DESCRIPTION - LOCATION: LOCATION: ABDOMEN

## 2019-05-23 ASSESSMENT — PAIN DESCRIPTION - PAIN TYPE: TYPE: ACUTE PAIN

## 2019-05-23 ASSESSMENT — PAIN SCALES - GENERAL: PAINLEVEL_OUTOF10: 4
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
## 2019-05-23 NOTE — ED NOTES
Saw Patient with mid-level and agree with plan. Patient is stable for transfer to [facility]. Patient was given 1 dose of Geodon 10 mg IM. She is improved.      Stephanie Loaiza MD  05/23/19 0823

## 2019-05-23 NOTE — ED PROVIDER NOTES
[facility] EMERGENCY DEPT  eMERGENCY dEPARTMENT eNCOUnter      Pt Name: Steve Díaz  MRN: [id number]  Kiragfdelvis 1990  Date of evaluation: 5/23/2019  Provider: RANDALL Marroquin    CHIEF COMPLAINT       Chief Complaint   Patient presents with   [address] Problem     pt is here w mother who states pt is \"eating weird things, destroyed her apartment, and hoarding\"          HISTORY OF PRESENT ILLNESS   (Location/Symptom, Timing/Onset,Context/Setting, Quality, Duration, Modifying Factors, Severity)  Note limiting factors. Fuad Ortiz a 30 y.o. female who presents to the emergency department for evaluation of mental health problem. Pt presents with mom giving history of decompensated schizophrenia worsening over past month. She tells me that patient is generally able to live alone. Mom tells me that apartment and patient have been disheveled lately. She has destroyed a computer and tv recently reporting that demons were coming out of these electronic devices describing them being threatening to her. She has been eating non food substances-cat food. Mom tells me that she doesn't think she has been drinking enough fluids. Pt tells me that she stopped her medications a month ago. Mom tells me that she [facility] close [facility] but is no longer attending treatment there. HPI    Nursing Notes were reviewed. REVIEW OF SYSTEMS    (2-9 systems for level 4, 10 or more for level 5)     Review of Systems   Unable to perform ROS: Psychiatric disorder       A complete review of systems was performed and is negative except as noted above in the HPI. PAST MEDICAL HISTORY     Past Medical History:   Diagnosis Date    Schizophrenia, schizo-affective ([facility] [address]     Schizophrenia, schizo-affective type, depressed ([facility] [address]          SURGICAL HISTORY     No past surgical history on file.       CURRENT MEDICATIONS       Previous Medications    ARIPIPRAZOLE (ABILIFY) 10 MG TABLET    Take 1 tablet by mouth daily    DIVALPROEX (DEPAKOTE) 250 MG DR TABLET    Take 1 tablet by mouth 3 times daily    HALOPERIDOL (HALDOL) 2 MG TABLET    Take 1 tablet by mouth 2 times daily    HALOPERIDOL DECANOATE (HALDOL DECANOATE) 50 MG/ML INJECTION    Inject 50 mg into the muscle once    VITAMIN D (ERGOCALCIFEROL) 01158 UNITS CAPSULE    Take 1 capsule by mouth once a week for 11 doses       ALLERGIES     Patient has no known allergies. FAMILY HISTORY     No family history on file.        SOCIAL HISTORY       Social History     Socioeconomic History    Marital status: Single     Spouse name: Not on file    Number of children: Not on file    Years of education: Not on file    Highest education level: Not on file   Occupational History    Not on file   Social Needs    Financial resource strain: Not on file    Food insecurity:     Worry: Not on file     Inability: Not on file    Transportation needs:     Medical: Not on file     Non-medical: Not on file   Tobacco Use    Smoking status: Never Smoker   Substance and Sexual Activity    Alcohol use: No    Drug use: No    Sexual activity: Not on file   Lifestyle    Physical activity:     Days per week: Not on file     Minutes per session: Not on file    Stress: Not on file   Relationships    Social connections:     Talks on phone: Not on file     Gets together: Not on file     Attends Je[employer] witness service: Not on file     Active member of club or organization: Not on file     Attends meetings of clubs or organizations: Not on file     Relationship status: Not on file    Intimate partner violence:     Fear of current or ex partner: Not on file     Emotionally abused: Not on file     Physically abused: Not on file     Forced sexual activity: Not on file   Other Topics Concern    Not on file   Social History Narrative    Not on file       SCREENINGS             PHYSICAL EXAM    (up to 7 for level 4, 8 or more for level 5)     ED Triage Vitals [05/23/19 1135]   BP Temp Temp Source PREGNANCY, URINE   ETHANOL   ACETAMINOPHEN LEVEL   SALICYLATE LEVEL       All other labs were within normal range or not returned as of this dictation. RE-ASSESSMENT     Mental health professional Taz Valera has evaluated patient in conjunction with Dr. Alma Reed with plan to transfer patient to [place] [address] and DIFFERENTIALDIAGNOSIS/MDM:   Vitals:    Vitals:    05/23/19 1135   BP: (!) 148/95   Pulse: 71   Resp: 17   Temp: 98 °F (36.7 °C)   TempSrc: Oral   SpO2: 96%   Weight: 240 lb (108.9 kg)   Height: 5' 2\" (1.575 m)       MDM      CONSULTS:  IP CONSULT TO PSYCHIATRY    PROCEDURES:  Unless otherwise notedbelow, none     Procedures    FINAL IMPRESSION     1. Disorganized schizophrenia ([address]          DISPOSITION/PLAN   DISPOSITION        PATIENT REFERRED TO:  No follow-up provider specified.     DISCHARGE MEDICATIONS:       Current Discharge Medication List          (Pleasenote that portions of this note were completed with a voice recognition program.  Efforts were made to edit the dictations but occasionally words are mis-transcribed.)              Long Rater, APRN  05/23/19 9823

## 2019-05-23 NOTE — BH NOTE
Psychiatry Initial Intake    5/23/19    Glo Hernandez ,a 30 y.o. female, presents to the ED for a psychiatric assessment. ED Arrival time: 80  ED physician: [facility] Notification time: 1400  GO Assess time: 1400  Psychiatrist call time: 46  Spoke with Dr. Nataliia Dowling Diagnosis or Reason for Discharge:transfer to [facility]   ETOH:<10    Patient is referred by: brought to Ed by her mother. Reason for visit to ED - Presenting problem:     PT states reason for ED visit, \"I stopped taking my pills & I needs some more pills. I ran out of food, I ate some noodles, beans and wheat. I need some homeless resources for some housing. I need a therapy source clinic & I just need some paperwork. I need some youth center resources, I need some I don't know. \"  Pt becomes agitated after I asked another question. Pt yells, \"Fuxk you dumb ass [facility], what business it of yours. You don't need to know that, fuxk you. \" Unable to redirect pt, I exit the room and patient to yell. Pt yells, \"if you all are out there in my business you are about to get fuxked up. \"      ED provider note: Mary Baltazar a 30 y.o. female who presents to the emergency department for evaluation of mental health problem. Pt presents with mom giving history of decompensated schizophrenia worsening over past month. She tells me that patient is generally able to live alone. Mom tells me that apartment and patient have been disheveled lately. She has destroyed a computer and tv recently reporting that demons were coming out of these electronic devices describing them being threatening to her. She has been eating non food substances-cat food. Mom tells me that she doesn't think she has been drinking enough fluids. Pt tells me that she stopped her medications a month ago. Mom tells me that she lives close to [address] but is no longer attending treatment there    Unable to complete assessment.  Mother present in the ED reports pt is living in an unlivable condition. Report spt has not taken her psych meds in about a month, she has been sheldon, eating cat food and other non-food items. PT has been destructive and has physically destroyed her property inside her apartment. Dr. Nancy Su in ED to assess pt, decision made to transfer pt. Pt not complaint with treatment. [facility] paperwork completed. Disposition:     Choose one of the four options below for   disposition:     1. Decision to admit to :no    I  4. Transferred:       Patient was transferred due to: Needs higher level of care. PT is agitated, not complaint with assessment, pt is psychotic, verbally aggressive and threatening towards staff.

## 2025-04-22 ENCOUNTER — HOSPITAL ENCOUNTER (EMERGENCY)
Age: 35
Discharge: HOME | End: 2025-04-22
Payer: MEDICAID

## 2025-04-22 ENCOUNTER — APPOINTMENT (OUTPATIENT)
Dept: CT IMAGING | Age: 35
End: 2025-04-22
Payer: MEDICAID

## 2025-04-22 VITALS
RESPIRATION RATE: 16 BRPM | HEIGHT: 60 IN | WEIGHT: 240 LBS | SYSTOLIC BLOOD PRESSURE: 132 MMHG | HEART RATE: 86 BPM | TEMPERATURE: 98.3 F | OXYGEN SATURATION: 96 % | DIASTOLIC BLOOD PRESSURE: 86 MMHG | BODY MASS INDEX: 47.12 KG/M2

## 2025-04-22 DIAGNOSIS — H26.9: Primary | ICD-10-CM

## 2025-04-22 LAB
ALBUMIN SERPL-MCNC: 4.2 G/DL (ref 3.5–5.2)
ALP SERPL-CCNC: 128 U/L (ref 35–104)
ALT SERPL-CCNC: 29 U/L (ref 10–35)
ANION GAP SERPL CALCULATED.3IONS-SCNC: 8 MMOL/L (ref 8–16)
AST SERPL-CCNC: 23 U/L (ref 10–35)
BASOPHILS # BLD: 0 K/UL (ref 0–0.2)
BASOPHILS NFR BLD: 0.2 % (ref 0–1)
BILIRUB SERPL-MCNC: 0.3 MG/DL (ref 0.2–1.2)
BUN SERPL-MCNC: 7 MG/DL (ref 6–20)
CALCIUM SERPL-MCNC: 9.8 MG/DL (ref 8.6–10)
CHLORIDE SERPL-SCNC: 102 MMOL/L (ref 98–107)
CO2 SERPL-SCNC: 27 MMOL/L (ref 22–29)
CREAT SERPL-MCNC: 0.9 MG/DL (ref 0.5–0.9)
EOSINOPHIL # BLD: 0.1 K/UL (ref 0–0.6)
EOSINOPHIL NFR BLD: 0.9 % (ref 0–5)
ERYTHROCYTE [DISTWIDTH] IN BLOOD BY AUTOMATED COUNT: 13.9 % (ref 11.5–14.5)
ETHANOLAMINE SERPL-MCNC: <11 MG/DL (ref 0–11)
GLUCOSE SERPL-MCNC: 63 MG/DL (ref 70–99)
HCG SERPL QL: NEGATIVE
HCT VFR BLD AUTO: 36.5 % (ref 37–47)
HGB BLD-MCNC: 13.5 G/DL (ref 12–16)
IMM GRANULOCYTES # BLD: 0 K/UL
LYMPHOCYTES # BLD: 2.7 K/UL (ref 1.1–4.5)
LYMPHOCYTES NFR BLD: 40.9 % (ref 20–40)
MCH RBC QN AUTO: 32.2 PG (ref 27–31)
MCHC RBC AUTO-ENTMCNC: 37 G/DL (ref 33–37)
MCV RBC AUTO: 87.1 FL (ref 81–99)
MONOCYTES # BLD: 0.6 K/UL (ref 0–0.9)
MONOCYTES NFR BLD: 9 % (ref 0–10)
NEUTROPHILS # BLD: 3.2 K/UL (ref 1.5–7.5)
NEUTS SEG NFR BLD: 48.8 % (ref 50–65)
PLATELET # BLD AUTO: 259 K/UL (ref 130–400)
PMV BLD AUTO: 10.7 FL (ref 9.4–12.3)
POTASSIUM SERPL-SCNC: 4.8 MMOL/L (ref 3.5–5.1)
PROT SERPL-MCNC: 7.4 G/DL (ref 6.4–8.3)
RBC # BLD AUTO: 4.19 M/UL (ref 4.2–5.4)
SARS-COV-2 RDRP RESP QL NAA+PROBE: NOT DETECTED
SODIUM SERPL-SCNC: 137 MMOL/L (ref 136–145)
VALPROATE SERPL-MCNC: <2.8 UG/ML (ref 50–100)
WBC # BLD AUTO: 6.6 K/UL (ref 4.8–10.8)

## 2025-04-22 PROCEDURE — 70450 CT HEAD/BRAIN W/O DYE: CPT

## 2025-04-22 PROCEDURE — 6370000000 HC RX 637 (ALT 250 FOR IP): Performed by: NURSE PRACTITIONER

## 2025-04-22 PROCEDURE — 82077 ASSAY SPEC XCP UR&BREATH IA: CPT

## 2025-04-22 PROCEDURE — 80053 COMPREHEN METABOLIC PANEL: CPT

## 2025-04-22 PROCEDURE — 85025 COMPLETE CBC W/AUTO DIFF WBC: CPT

## 2025-04-22 PROCEDURE — 87635 SARS-COV-2 COVID-19 AMP PRB: CPT

## 2025-04-22 PROCEDURE — 84703 CHORIONIC GONADOTROPIN ASSAY: CPT

## 2025-04-22 PROCEDURE — 99284 EMERGENCY DEPT VISIT MOD MDM: CPT

## 2025-04-22 PROCEDURE — 80164 ASSAY DIPROPYLACETIC ACD TOT: CPT

## 2025-04-22 PROCEDURE — 36415 COLL VENOUS BLD VENIPUNCTURE: CPT

## 2025-04-22 RX ORDER — TETRACAINE HYDROCHLORIDE 5 MG/ML
1 SOLUTION OPHTHALMIC ONCE
Status: COMPLETED | OUTPATIENT
Start: 2025-04-22 | End: 2025-04-22

## 2025-04-22 RX ADMIN — FLUORESCEIN SODIUM 1 MG: 1 STRIP OPHTHALMIC at 15:19

## 2025-04-22 RX ADMIN — TETRACAINE HYDROCHLORIDE 1 DROP: 5 SOLUTION OPHTHALMIC at 15:19

## 2025-04-22 ASSESSMENT — ENCOUNTER SYMPTOMS
NAUSEA: 0
COUGH: 0
EYE DISCHARGE: 0
SHORTNESS OF BREATH: 0
ABDOMINAL PAIN: 0
VOMITING: 0
PHOTOPHOBIA: 0
EYE PAIN: 0
BACK PAIN: 0
EYE REDNESS: 0
EYE ITCHING: 0

## 2025-04-22 ASSESSMENT — TONOMETRY
OD_IOP_MMHG: 8
OS_IOP_MMHG: 10
IOP_AUTOMATED: 1

## 2025-04-22 ASSESSMENT — PAIN - FUNCTIONAL ASSESSMENT: PAIN_FUNCTIONAL_ASSESSMENT: NONE - DENIES PAIN

## 2025-04-22 ASSESSMENT — VISUAL ACUITY: OU: 1

## 2025-04-22 NOTE — ED PROVIDER NOTES
[facility] EMERGENCY DEPARTMENT  EMERGENCY DEPARTMENT ENCOUNTER      Pt Name: Sandra Arce  MRN: [id number]  Birthdate 1990  Date of evaluation: 4/22/2025  Provider: RANDALL Gilmore CNP  4:51 PM    CHIEF COMPLAINT       Chief Complaint   Patient presents with    Vision Change     Pt states \"Im blind in my right eye and my vision is blurry in my left\"         HISTORY OF PRESENT ILLNESS    Sandra Arce is a 35 y.o. female who presents to the emergency department accompanied by mom with concern for vision change. It sounds as if this vision change has been gradual for quite some time. She lives in an assisted living setting where she is given her psych meds daily. Mom visits occasionally and reports that Sandra has complained that her vision is getting worse despite glasses. States right eye worse than left, but both are poor despite wearing her glasses. Cannot recall last eye doctor visit. No recent trauma or injury. No black fields. Describes as blurry. Concerned for cataract in right eye.     HPI    Nursing Notes were reviewed.    REVIEW OF SYSTEMS       Review of Systems   Constitutional:  Negative for chills and fever.   Eyes:  Positive for visual disturbance. Negative for photophobia, pain, discharge, redness and itching.   Respiratory:  Negative for cough and shortness of breath.    Cardiovascular:  Negative for chest pain and palpitations.   Gastrointestinal:  Negative for abdominal pain, nausea and vomiting.   Musculoskeletal:  Negative for back pain and neck pain.   Neurological:  Negative for dizziness, syncope, weakness, light-headedness, numbness and headaches.             PAST MEDICAL HISTORY     Past Medical History:   Diagnosis Date    Schizophrenia, schizo-affective (HCC)     Schizophrenia, schizo-affective type, depressed (HCC)          SURGICAL HISTORY     History reviewed. No pertinent surgical history.      CURRENT MEDICATIONS       Discharge Medication List as of 4/22/2025  3:18